# Patient Record
Sex: MALE | Race: ASIAN | Employment: UNEMPLOYED | URBAN - METROPOLITAN AREA
[De-identification: names, ages, dates, MRNs, and addresses within clinical notes are randomized per-mention and may not be internally consistent; named-entity substitution may affect disease eponyms.]

---

## 2021-12-06 ENCOUNTER — HOSPITAL ENCOUNTER (EMERGENCY)
Age: 31
Discharge: HOME | End: 2021-12-06
Attending: EMERGENCY MEDICINE

## 2021-12-06 VITALS
SYSTOLIC BLOOD PRESSURE: 136 MMHG | HEART RATE: 125 BPM | OXYGEN SATURATION: 97 % | DIASTOLIC BLOOD PRESSURE: 74 MMHG | TEMPERATURE: 98 F | RESPIRATION RATE: 16 BRPM

## 2021-12-06 DIAGNOSIS — S00.81XA ABRASION OF FACE, INITIAL ENCOUNTER: Primary | ICD-10-CM

## 2021-12-06 PROCEDURE — 99283 EMERGENCY DEPT VISIT LOW MDM: CPT

## 2021-12-07 ASSESSMENT — ENCOUNTER SYMPTOMS
VOMITING: 0
NAUSEA: 0
SHORTNESS OF BREATH: 0
PHOTOPHOBIA: 0

## 2021-12-08 NOTE — ED PROVIDER NOTES
[facility]  ED  EMERGENCY DEPARTMENTENCOUNTER      Pt Name: Navin Russell  MRN: [id number]  [place] 1990  Date ofevaluation: 12/6/2021  Provider: Robert Aguirre MD    CHIEF COMPLAINT       Chief Complaint   Patient presents with    Alcohol Intoxication     stuck head, fell down, was walking into an apartment that was not his. in police custody         HISTORY OF PRESENT ILLNESS   (Location/Symptom, Timing/Onset,Context/Setting, Quality, Duration, Modifying Factors, Severity)  Note limiting factors. Navin Russell is a 36 y.o. male  who  has no past medical history on file. who presents to the emergency department for evaluation of a head injury. Patient is currently in the custody of police. They report that they intercepted the patient attempting to enter an apartment that was on his. Patient required restraints from the police and was resisting. While resisting the police the patient did fall and hit his head on the ground. No reported loss of consciousness. Patient able to ambulate immediately after the incident. Patient does have abrasions of the right side of the face. Patient denies headache changes in vision neck pain nausea or vomiting. Denies numbness or weakness. Patient is ambulating moving all extremities purposefully. HPI    NursingNotes were reviewed. REVIEW OF SYSTEMS    (2-9 systems for level 4, 10 or more for level 5)     Review of Systems   HENT: Negative for nosebleeds. Eyes: Negative for photophobia and visual disturbance. Respiratory: Negative for shortness of breath. Gastrointestinal: Negative for nausea and vomiting. Musculoskeletal: Negative for neck pain and neck stiffness. Skin: Positive for wound. Neurological: Negative for dizziness, seizures, syncope, facial asymmetry, speech difficulty, weakness, light-headedness, numbness and headaches. Except as noted above the remainder of the review of systems was reviewed and negative. PAST MEDICAL HISTORY   No past medical history on file. SURGICALHISTORY     No past surgical history on file. CURRENT MEDICATIONS     There are no discharge medications for this patient. Patient has no known allergies. FAMILY HISTORY     No family history on file. SOCIAL HISTORY       Social History     Socioeconomic History    Marital status: Single     Spouse name: Not on file    Number of children: Not on file    Years of education: Not on file    Highest education level: Not on file   Occupational History    Not on file   Tobacco Use    Smoking status: Unknown If Ever Smoked    Smokeless tobacco: Not on file   Substance and Sexual Activity    Alcohol use: Yes    Drug use: Not Currently    Sexual activity: Not on file   Other Topics Concern    Not on file   Social History Narrative    Not on file     Social Determinants of Health     Financial Resource Strain:     Difficulty of Paying Living Expenses: Not on file   Food Insecurity:     Worried About Running Out of Food in the Last Year: Not on file    Yash of Food in the Last Year: Not on file   Transportation Needs:     Lack of Transportation (Medical): Not on file    Lack of Transportation (Non-Medical):  Not on file   Physical Activity:     Days of Exercise per Week: Not on file    Minutes of Exercise per Session: Not on file   Stress:     Feeling of Stress : Not on file   Social Connections:     Frequency of Communication with Friends and Family: Not on file    Frequency of Social Gatherings with Friends and Family: Not on file    Attends Anabaptist Services: Not on file    Active Member of Clubs or Organizations: Not on file    Attends Club or Organization Meetings: Not on file    Marital Status: Not on file   Intimate Partner Violence:     Fear of Current or Ex-Partner: Not on file    Emotionally Abused: Not on file    Physically Abused: Not on file    Sexually Abused: Not on file   Housing Department Physician who either signs or Co-signsthis chart in the absence of a cardiologist.      RADIOLOGY:   Jacquelyn Oxford such as CT, Ultrasound and MRI are read by the radiologist. Plain radiographic images are visualized and preliminarily interpreted by the emergency physician with the below findings:        Interpretation per the Radiologist below, if available at the time ofthis note:    No orders to display         ED BEDSIDE ULTRASOUND:   Performed by ED Physician - none    LABS:  Labs Reviewed - No data to display    All other labs were within normal range or not returned as of this dictation. EMERGENCY DEPARTMENT COURSE and DIFFERENTIAL DIAGNOSIS/MDM:   Vitals:    Vitals:    12/06/21 2042 12/06/21 2119   BP: 136/74    Pulse: 143 125   Resp: 16 16   Temp: 98 °F (36.7 °C)    SpO2: 92% 97%       Patient was given thefollowing medications:  Medications - No data to display    ED COURSE & MEDICAL DECISION MAKING    Pertinent Labs & Imaging studies reviewed. (See chart for details)   -  Patient seen and evaluated in the emergency department. -  Triage and nursing notes reviewed and incorporated. -  Old chart records reviewed and incorporated. -  Differential diagnosis includes: Differential Diagnosis: epidural hematoma, subdural hematoma, parenchymal brain contusion or bleed, subarachnoid hemorrhage, skull fracture, neck fracture or dislocation, other.    -  Work-up included:  See above  -  ED treatment included: See above  -  Results discussed with patient. Patient brought to ED for evaluation after head injury. Patient was resisting police causing said injury. No reported LOC. Patient does have minor abrasions over the right side of his face. He has no complaints. Neurological exam is benign. No midline cervical tenderness. C-spine cleared with Nexus criteria. Per pain CT rules patient is low risk for TBI and imaging not recommended.   Patient does appear to be agitated and did get in a verbal altercation with police. He is noted to be tachycardic but heart rate improving and I suspect this is likely secondary to the current situation the patient is in. Patient feels improved on reevaluation. Symptomatic treatment with expectant management discussed with the patient and they and/or family members present are amenable to treatment plan and outpatient follow-up. Strict return precautions were discussed with the patient and those present. They demonstrated understanding of when to return to the emergency department for new or worsening symptoms. .  The patient is agreeable with plan of care and disposition. REASSESSMENT          CRITICAL CARE TIME   Total Critical Care time was 0 minutes, excluding separately reportable procedures. There was a high probability of clinically significant/life threatening deterioration in the patient's condition which required my urgent intervention. CONSULTS:  None    PROCEDURES:  Unless otherwise noted below, none     Procedures    FINAL IMPRESSION      1. Abrasion of face, initial encounter          DISPOSITION/PLAN   DISPOSITION Decision To Discharge 12/06/2021 08:39:59 PM      PATIENT REFERREDTO:  [facility]) Pre-Services  [phone]          DISCHARGEMEDICATIONS:  There are no discharge medications for this patient.          (Please note that portions of this note were completed with a voice recognition program.  Efforts were made to edit the dictations but occasionally words are mis-transcribed.)    Naren Shaw MD (electronically signed)  Attending Emergency Physician          Naren Shaw MD  12/07/21 0863